# Patient Record
Sex: FEMALE | Race: WHITE | URBAN - METROPOLITAN AREA
[De-identification: names, ages, dates, MRNs, and addresses within clinical notes are randomized per-mention and may not be internally consistent; named-entity substitution may affect disease eponyms.]

---

## 2025-06-03 ENCOUNTER — OFFICE VISIT (OUTPATIENT)
Dept: URGENT CARE | Age: 75
End: 2025-06-03
Payer: COMMERCIAL

## 2025-06-03 VITALS
HEART RATE: 88 BPM | RESPIRATION RATE: 16 BRPM | TEMPERATURE: 98.3 F | OXYGEN SATURATION: 97 % | DIASTOLIC BLOOD PRESSURE: 59 MMHG | SYSTOLIC BLOOD PRESSURE: 116 MMHG

## 2025-06-03 DIAGNOSIS — J06.9 VIRAL URI: ICD-10-CM

## 2025-06-03 DIAGNOSIS — J02.9 SORE THROAT: Primary | ICD-10-CM

## 2025-06-03 LAB
POC HUMAN RHINOVIRUS PCR: NEGATIVE
POC INFLUENZA A VIRUS PCR: NEGATIVE
POC INFLUENZA B VIRUS PCR: NEGATIVE
POC RESPIRATORY SYNCYTIAL VIRUS PCR: NEGATIVE
POC STREPTOCOCCUS PYOGENES (GROUP A STREP) PCR: NEGATIVE

## 2025-06-03 RX ORDER — ASPIRIN 81 MG/1
81 TABLET ORAL
COMMUNITY
Start: 2023-10-04

## 2025-06-03 RX ORDER — ROSUVASTATIN CALCIUM 20 MG/1
30 TABLET, COATED ORAL DAILY
COMMUNITY

## 2025-06-03 RX ORDER — CITALOPRAM 10 MG/1
10 TABLET ORAL DAILY
COMMUNITY

## 2025-06-03 ASSESSMENT — ENCOUNTER SYMPTOMS
FATIGUE: 1
COUGH: 1
SORE THROAT: 1

## 2025-06-03 NOTE — PATIENT INSTRUCTIONS
You were seen at Urgent Care today and diagnosed with a viral infection. Please treat as discussed.  Monitor for red flags which we spoke about, If your symptoms change, worsen or become concerning in any way, please go to the emergency room immediately, otherwise you can followup with your PCP in 2-3 days as needed

## 2025-06-03 NOTE — PROGRESS NOTES
Subjective   Patient ID: Noni Hercules is a 75 y.o. female. They present today with a chief complaint of Sore Throat, Cough, Nasal Congestion, and Fatigue (Pt presents with cough. Sore throat, congestion and fatigue it all started late Saturday. ).    History of Present Illness  Patient is a 75-year-old female with history of dyslipidemia who presents urgent care today with a complaint of cold symptoms.  She states her symptoms started 4 days ago with a minor sore throat.  She notes that got significantly worse a couple of days ago and included nasal congestion, cough and fatigue.  She thought she was getting better yesterday but her symptoms have returned today.  She denies any fevers, vomiting, chest pain or shortness of breath.  No other complaints or concerns mention at this time.      History provided by:  Patient  Sore Throat   Associated symptoms include congestion and coughing.   Cough  Associated symptoms include a sore throat.   Fatigue  Associated symptoms: congestion, cough, fatigue and sore throat        Past Medical History  Allergies as of 06/03/2025    (No Known Allergies)       Prescriptions Prior to Admission[1]       Medical History[2]    Surgical History[3]     reports that she has never smoked. She has never been exposed to tobacco smoke. She has never used smokeless tobacco. She reports that she does not currently use alcohol. She reports that she does not currently use drugs.    Review of Systems  Review of Systems   Constitutional:  Positive for fatigue.   HENT:  Positive for congestion and sore throat.    Respiratory:  Positive for cough.                                   Objective    Vitals:    06/03/25 1909   BP: 116/59   BP Location: Left arm   Patient Position: Sitting   Pulse: 88   Resp: 16   Temp: 36.8 °C (98.3 °F)   SpO2: 97%     No LMP recorded (lmp unknown). Patient is postmenopausal.    Physical Exam  Vitals and nursing note reviewed.   Constitutional:       General: She is  not in acute distress.     Appearance: Normal appearance. She is not ill-appearing, toxic-appearing or diaphoretic.   HENT:      Head: Normocephalic and atraumatic.      Right Ear: There is impacted cerumen.      Left Ear: Tympanic membrane, ear canal and external ear normal.      Nose: Congestion present.      Mouth/Throat:      Mouth: Mucous membranes are moist.      Pharynx: Posterior oropharyngeal erythema present. No oropharyngeal exudate.   Eyes:      Extraocular Movements: Extraocular movements intact.      Conjunctiva/sclera: Conjunctivae normal.      Pupils: Pupils are equal, round, and reactive to light.   Cardiovascular:      Rate and Rhythm: Normal rate and regular rhythm.      Pulses: Normal pulses.      Heart sounds: Normal heart sounds.   Pulmonary:      Effort: Pulmonary effort is normal. No respiratory distress.      Breath sounds: Normal breath sounds. No stridor. No wheezing, rhonchi or rales.   Chest:      Chest wall: No tenderness.   Musculoskeletal:         General: Normal range of motion.      Cervical back: Normal range of motion and neck supple.   Skin:     General: Skin is warm and dry.      Capillary Refill: Capillary refill takes less than 2 seconds.   Neurological:      General: No focal deficit present.      Mental Status: She is alert and oriented to person, place, and time.   Psychiatric:         Mood and Affect: Mood normal.         Behavior: Behavior normal.         Procedures      Assessment/Plan   Allergies, medications, history, and pertinent labs/EKGs/Imaging reviewed by SHANIQUE Hu.     Medical Decision Making    Patient is well appearing, afebrile, non toxic, not hypoxic, and appropriate for outpatient treatment and management at time of evaluation. Patient presents with cold symptoms x 4 days.     Differential includes but not limited to: Rhinovirus, influenza, streptococcal pharyngitis, pneumonia, other    On exam lung sounds are noted to be clear in all fields  bilaterally.  Oropharynx with bilateral erythema without appreciable tonsillar swelling or exudate.  No clinical signs of peritonsillar abscess or Ty's angina.  Left ear canal with heavy cerumen buildup.  Significant nasal congestion present.  Patient is afebrile appears well-hydrated.  Vitals are within normal limits.    Strep PCR is negative.   rapid influenza A/B, rhinovirus and RSV are also negative. Low suspicion for pneumonia based on history and exam.  Suspect symptoms are viral in nature.  Recommended continued use of over-the-counter medication such as Claritin/Zyrtec and/or Flonase.  Patient was given the opportunity ask questions.    Discussed return precautions and importance of follow-up. Advised to follow-up with PCP.  We spoke at length regarding the red flags he/she should be aware of and monitor for.  I specifically advised to go to the ED for changing or worsening symptoms, new symptoms, complaint specific precautions, and precautions listed on the discharge paperwork.    The plan of care was mutually agreed upon with the patient. All questions and concerns were answered to the best of my ability with today's information.  Patient was discharged in stable condition.    Dictation software was used in the creation of this note which does not evaluate or correct for typographical, spelling, syntax or grammatical errors.    Orders and Diagnoses  Diagnoses and all orders for this visit:  Sore throat  -     POCT SPOTFIRE R/ST Panel Mini w/Strep A ([facility]) manually resulted      Medical Admin Record      Follow Up Instructions  No follow-ups on file.    Patient disposition: Home    Electronically signed by SHANIQUE Hu  7:18 PM       [1] (Not in a hospital admission)  [2] No past medical history on file.  [3] No past surgical history on file.